# Patient Record
Sex: FEMALE | Race: WHITE | NOT HISPANIC OR LATINO | URBAN - METROPOLITAN AREA
[De-identification: names, ages, dates, MRNs, and addresses within clinical notes are randomized per-mention and may not be internally consistent; named-entity substitution may affect disease eponyms.]

---

## 2020-08-12 ENCOUNTER — WEB ENCOUNTER (OUTPATIENT)
Dept: URBAN - METROPOLITAN AREA CLINIC 2 | Facility: CLINIC | Age: 50
End: 2020-08-12

## 2020-09-14 ENCOUNTER — OFFICE VISIT (OUTPATIENT)
Dept: URBAN - METROPOLITAN AREA TELEHEALTH 2 | Facility: TELEHEALTH | Age: 50
End: 2020-09-14
Payer: COMMERCIAL

## 2020-09-14 DIAGNOSIS — K21.9 GERD: ICD-10-CM

## 2020-09-14 DIAGNOSIS — B96.81 HELICOBACTER PYLORI [H. PYLORI] AS THE CAUSE OF DISEASES CLASSIFIED ELSEWHERE: ICD-10-CM

## 2020-09-14 DIAGNOSIS — L29.0 ANAL ITCHING: ICD-10-CM

## 2020-09-14 DIAGNOSIS — R13.19 CERVICAL DYSPHAGIA: ICD-10-CM

## 2020-09-14 PROCEDURE — G9903 PT SCRN TBCO ID AS NON USER: HCPCS | Performed by: INTERNAL MEDICINE

## 2020-09-14 PROCEDURE — 99214 OFFICE O/P EST MOD 30 MIN: CPT | Performed by: INTERNAL MEDICINE

## 2020-09-14 PROCEDURE — G8422 PT INELIG BMI CALCULATION: HCPCS | Performed by: INTERNAL MEDICINE

## 2020-09-14 PROCEDURE — G8427 DOCREV CUR MEDS BY ELIG CLIN: HCPCS | Performed by: INTERNAL MEDICINE

## 2020-09-14 PROCEDURE — 1036F TOBACCO NON-USER: CPT | Performed by: INTERNAL MEDICINE

## 2020-09-14 RX ORDER — ALPHA-D-GALACTOSIDASE 400 UNIT
TWO TABLETS TABLET ORAL ONCE A DAY
Qty: 180 | OUTPATIENT
Start: 2020-09-14

## 2020-09-14 NOTE — HPI-OTHER HISTORIES
She reports on and off difficulty swallowing. We discussed restarting . She reports scot anal itching. We discussed lotrimin. We discused checking eradication of h pylori

## 2020-10-29 ENCOUNTER — WEB ENCOUNTER (OUTPATIENT)
Dept: URBAN - METROPOLITAN AREA CLINIC 21 | Facility: CLINIC | Age: 50
End: 2020-10-29

## 2020-12-14 ENCOUNTER — WEB ENCOUNTER (OUTPATIENT)
Dept: URBAN - METROPOLITAN AREA CLINIC 2 | Facility: CLINIC | Age: 50
End: 2020-12-14

## 2020-12-14 ENCOUNTER — OFFICE VISIT (OUTPATIENT)
Dept: URBAN - METROPOLITAN AREA CLINIC 2 | Facility: CLINIC | Age: 50
End: 2020-12-14
Payer: COMMERCIAL

## 2020-12-14 DIAGNOSIS — K29.70 GASTRITIS, UNSPECIFIED, WITHOUT BLEEDING: ICD-10-CM

## 2020-12-14 DIAGNOSIS — D50.9 IRON DEFICIENCY ANEMIA, UNSPECIFIED IRON DEFICIENCY ANEMIA TYPE: ICD-10-CM

## 2020-12-14 DIAGNOSIS — B96.81 HELICOBACTER PYLORI [H. PYLORI] AS THE CAUSE OF DISEASES CLASSIFIED ELSEWHERE: ICD-10-CM

## 2020-12-14 PROBLEM — 708164002: Status: ACTIVE | Noted: 2020-12-14

## 2020-12-14 PROCEDURE — G9903 PT SCRN TBCO ID AS NON USER: HCPCS | Performed by: NURSE PRACTITIONER

## 2020-12-14 PROCEDURE — 99214 OFFICE O/P EST MOD 30 MIN: CPT | Performed by: NURSE PRACTITIONER

## 2020-12-14 PROCEDURE — G8427 DOCREV CUR MEDS BY ELIG CLIN: HCPCS | Performed by: NURSE PRACTITIONER

## 2020-12-14 PROCEDURE — G8420 CALC BMI NORM PARAMETERS: HCPCS | Performed by: NURSE PRACTITIONER

## 2020-12-14 RX ORDER — PANTOPRAZOLE SODIUM 40 MG/1
1 TABLET TABLET, DELAYED RELEASE ORAL ONCE A DAY
Status: ACTIVE | COMMUNITY

## 2020-12-14 RX ORDER — ALPHA-D-GALACTOSIDASE 400 UNIT
TWO TABLETS TABLET ORAL ONCE A DAY
Qty: 180 | Status: ACTIVE | COMMUNITY
Start: 2020-09-14

## 2020-12-14 NOTE — HPI-TODAY'S VISIT:
50 yr old pt seen initially after a referral from Dr. Vidal Dominguez for anemia. She had EGD 12/2019 notable for possible Charles's esophagus and h pylori gastritis. Repeat EGD 4/20 notable for esophagitis and  Hpylori gastritis. She was given abx. since then continues to "not feel well." She c/o throat tightness, shortness of breath and a bubble in her esophagus and stomach. .  She is taking PPI and tagamet. She is here today in follow up. of note pt had colonoscopy about 3yrs ago. She does have a lot of stress with home and work and wonders if this stress may play a role in her symptoms. She does have IBS and occasionally has loose stools. She is unsure if her recent labs still indicated that she was anemic.

## 2020-12-15 ENCOUNTER — OFFICE VISIT (OUTPATIENT)
Dept: URBAN - METROPOLITAN AREA CLINIC 2 | Facility: CLINIC | Age: 50
End: 2020-12-15

## 2020-12-31 LAB
BASO (ABSOLUTE): 0
BASOS: 1
EOS (ABSOLUTE): 0.2
EOS: 4
FERRITIN, SERUM: 18
HEMATOCRIT: 36.1
HEMATOLOGY COMMENTS:: (no result)
HEMOGLOBIN: 12
IMMATURE CELLS: (no result)
IMMATURE GRANS (ABS): 0
IMMATURE GRANULOCYTES: 0
IRON BIND.CAP.(TIBC): 422
IRON SATURATION: 9
IRON: 39
LYMPHS (ABSOLUTE): 1.7
LYMPHS: 30
MCH: 27.8
MCHC: 33.2
MCV: 84
MONOCYTES(ABSOLUTE): 0.5
MONOCYTES: 9
NEUTROPHILS (ABSOLUTE): 3.3
NEUTROPHILS: 56
NRBC: (no result)
PLATELETS: 254
RBC: 4.31
RDW: 14.5
UIBC: 383
VITAMIN B12: 314
WBC: 5.8

## 2021-02-01 ENCOUNTER — OFFICE VISIT (OUTPATIENT)
Dept: URBAN - METROPOLITAN AREA CLINIC 2 | Facility: CLINIC | Age: 51
End: 2021-02-01

## 2021-02-15 ENCOUNTER — OFFICE VISIT (OUTPATIENT)
Dept: URBAN - METROPOLITAN AREA TELEHEALTH 2 | Facility: TELEHEALTH | Age: 51
End: 2021-02-15
Payer: COMMERCIAL

## 2021-02-15 DIAGNOSIS — D50.8 ANEMIA, DUE TO INADEQUATE IRON INTAKE: ICD-10-CM

## 2021-02-15 DIAGNOSIS — R19.7 DIARRHEA, UNSPECIFIED TYPE: ICD-10-CM

## 2021-02-15 DIAGNOSIS — K21.00 GASTROESOPHAGEAL REFLUX DISEASE WITH ESOPHAGITIS WITHOUT HEMORRHAGE: ICD-10-CM

## 2021-02-15 DIAGNOSIS — D50.9 IRON DEFICIENCY ANEMIA, UNSPECIFIED IRON DEFICIENCY ANEMIA TYPE: ICD-10-CM

## 2021-02-15 PROCEDURE — 99443 PHONE E/M BY PHYS 21-30 MIN: CPT | Performed by: INTERNAL MEDICINE

## 2021-02-15 RX ORDER — PANTOPRAZOLE SODIUM 40 MG/1
1 TABLET TABLET, DELAYED RELEASE ORAL ONCE A DAY
Status: ACTIVE | COMMUNITY

## 2021-02-15 RX ORDER — ALPHA-D-GALACTOSIDASE 400 UNIT
TWO TABLETS TABLET ORAL ONCE A DAY
Qty: 180 TABLET | Refills: 1
Start: 2020-09-14

## 2021-02-15 RX ORDER — BUTALBITAL AND ACETAMINOPHEN 50; 325 MG/1; MG/1
1 TABLET AS NEEDED TABLET ORAL
Status: ACTIVE | COMMUNITY

## 2021-02-15 RX ORDER — PROPRANOLOL HYDROCHLORIDE 20 MG/1
1 TABLET TABLET ORAL TWICE A DAY
Status: ACTIVE | COMMUNITY

## 2021-02-15 RX ORDER — ALPHA-D-GALACTOSIDASE 400 UNIT
TWO TABLETS TABLET ORAL ONCE A DAY
Qty: 180 | Status: ACTIVE | COMMUNITY
Start: 2020-09-14

## 2021-02-15 RX ORDER — MONTELUKAST SODIUM 10 MG/1
1 TABLET TABLET ORAL ONCE A DAY
Status: ACTIVE | COMMUNITY

## 2021-02-15 NOTE — HPI-TODAY'S VISIT:
Very pleasant 50 yr old female seen via teleheath today in follow up for GERD and BRADY. At her last visit dominick completed abx for H pylori. We stopped ppi and tagamet for test of clearance which was negative. She did not resume pantoprazole and only takes tagamet as needed. heartburn has improved since she has stopped taking BC powder for headaches.  She also started singulair and butalbital for headaches. She has been on 2 rounds of antibiotics for recurrent sinus infections. Since then she is having diarrhea. She did start probiotics today and will let us know if diarrhea does not improve.

## 2021-04-26 ENCOUNTER — OFFICE VISIT (OUTPATIENT)
Dept: URBAN - METROPOLITAN AREA CLINIC 2 | Facility: CLINIC | Age: 51
End: 2021-04-26
Payer: COMMERCIAL

## 2021-04-26 DIAGNOSIS — D50.9 IRON DEFICIENCY ANEMIA, UNSPECIFIED IRON DEFICIENCY ANEMIA TYPE: ICD-10-CM

## 2021-04-26 DIAGNOSIS — K21.00 GASTROESOPHAGEAL REFLUX DISEASE WITH ESOPHAGITIS WITHOUT HEMORRHAGE: ICD-10-CM

## 2021-04-26 PROBLEM — 87522002: Status: ACTIVE | Noted: 2020-12-14

## 2021-04-26 PROCEDURE — 99214 OFFICE O/P EST MOD 30 MIN: CPT | Performed by: NURSE PRACTITIONER

## 2021-04-26 RX ORDER — MONTELUKAST SODIUM 10 MG/1
1 TABLET TABLET ORAL ONCE A DAY
Status: ACTIVE | COMMUNITY

## 2021-04-26 RX ORDER — PANTOPRAZOLE SODIUM 40 MG/1
1 TABLET TABLET, DELAYED RELEASE ORAL ONCE A DAY
Status: ACTIVE | COMMUNITY

## 2021-04-26 RX ORDER — ALPHA-D-GALACTOSIDASE 400 UNIT
TWO TABLETS TABLET ORAL ONCE A DAY
Qty: 180 TABLET | Refills: 1 | Status: ACTIVE | COMMUNITY
Start: 2020-09-14

## 2021-04-26 RX ORDER — CIMETIDINE 200 MG/1
1 TABLET AT BEDTIME TABLET, FILM COATED ORAL ONCE A DAY
Status: ACTIVE | COMMUNITY

## 2021-04-26 RX ORDER — PROPRANOLOL HYDROCHLORIDE 20 MG/1
1 TABLET TABLET ORAL TWICE A DAY
Status: DISCONTINUED | COMMUNITY

## 2021-04-26 RX ORDER — BUTALBITAL AND ACETAMINOPHEN 50; 325 MG/1; MG/1
1 TABLET AS NEEDED TABLET ORAL
Status: ACTIVE | COMMUNITY

## 2021-04-26 NOTE — HPI-TODAY'S VISIT:
Very pleasant 51 yr old female here in follow up for BRADY and GERD. At her last visit anemia had resolved. She stopped pantoprazole and was only taking tagamet. She eventually stopped that too. however, her headaches have not been well controlled so she has resumed BC powder. Shehas noted increased heartburn and GERD  symptoms. She also has noted some increased fatigue this week and felt cold. No overt GI bleeding. Shehas hx of Charles'sesoahgus but none on last EGD.

## 2021-04-27 LAB
BASO (ABSOLUTE): 0.1
BASOS: 1
EOS (ABSOLUTE): 0.1
EOS: 2
HEMATOCRIT: 41.3
HEMATOLOGY COMMENTS:: (no result)
HEMOGLOBIN: 13.9
IMMATURE CELLS: (no result)
IMMATURE GRANS (ABS): 0
IMMATURE GRANULOCYTES: 0
IRON BIND.CAP.(TIBC): 399
IRON SATURATION: 21
IRON: 82
LYMPHS (ABSOLUTE): 2.2
LYMPHS: 38
MCH: 28
MCHC: 33.7
MCV: 83
MONOCYTES(ABSOLUTE): 0.5
MONOCYTES: 8
NEUTROPHILS (ABSOLUTE): 2.9
NEUTROPHILS: 51
NRBC: (no result)
PLATELETS: 282
RBC: 4.96
RDW: 14.5
UIBC: 317
WBC: 5.8

## 2021-05-14 ENCOUNTER — OFFICE VISIT (OUTPATIENT)
Dept: URBAN - METROPOLITAN AREA SURGERY CENTER 19 | Facility: SURGERY CENTER | Age: 51
End: 2021-05-14
Payer: COMMERCIAL

## 2021-05-14 DIAGNOSIS — K22.70 BARRETT ESOPHAGUS: ICD-10-CM

## 2021-05-14 DIAGNOSIS — K29.30 CHRONIC SUPERFICIAL GASTRITIS: ICD-10-CM

## 2021-05-14 DIAGNOSIS — K29.80 DUODENITIS: ICD-10-CM

## 2021-05-14 PROCEDURE — 43239 EGD BIOPSY SINGLE/MULTIPLE: CPT | Performed by: INTERNAL MEDICINE

## 2021-05-14 PROCEDURE — G8907 PT DOC NO EVENTS ON DISCHARG: HCPCS | Performed by: INTERNAL MEDICINE

## 2021-05-14 RX ORDER — CIMETIDINE 200 MG/1
1 TABLET AT BEDTIME TABLET, FILM COATED ORAL ONCE A DAY
Status: ACTIVE | COMMUNITY

## 2021-05-14 RX ORDER — BUTALBITAL AND ACETAMINOPHEN 50; 325 MG/1; MG/1
1 TABLET AS NEEDED TABLET ORAL
Status: ACTIVE | COMMUNITY

## 2021-05-14 RX ORDER — MONTELUKAST SODIUM 10 MG/1
1 TABLET TABLET ORAL ONCE A DAY
Status: ACTIVE | COMMUNITY

## 2021-05-14 RX ORDER — ALPHA-D-GALACTOSIDASE 400 UNIT
TWO TABLETS TABLET ORAL ONCE A DAY
Qty: 180 TABLET | Refills: 1 | Status: ACTIVE | COMMUNITY
Start: 2020-09-14

## 2021-05-14 RX ORDER — PANTOPRAZOLE SODIUM 40 MG/1
1 TABLET TABLET, DELAYED RELEASE ORAL ONCE A DAY
Status: ACTIVE | COMMUNITY

## 2021-06-11 ENCOUNTER — TELEPHONE ENCOUNTER (OUTPATIENT)
Dept: URBAN - METROPOLITAN AREA CLINIC 6 | Facility: CLINIC | Age: 51
End: 2021-06-11

## 2021-06-15 ENCOUNTER — DASHBOARD ENCOUNTERS (OUTPATIENT)
Age: 51
End: 2021-06-15

## 2021-06-17 ENCOUNTER — OFFICE VISIT (OUTPATIENT)
Dept: URBAN - METROPOLITAN AREA CLINIC 2 | Facility: CLINIC | Age: 51
End: 2021-06-17
Payer: COMMERCIAL

## 2021-06-17 DIAGNOSIS — K21.00 GASTROESOPHAGEAL REFLUX DISEASE WITH ESOPHAGITIS WITHOUT HEMORRHAGE: ICD-10-CM

## 2021-06-17 DIAGNOSIS — K29.90 GASTRITIS AND DUODENITIS: ICD-10-CM

## 2021-06-17 PROBLEM — 196731005: Status: ACTIVE | Noted: 2021-06-17

## 2021-06-17 PROCEDURE — 99214 OFFICE O/P EST MOD 30 MIN: CPT | Performed by: INTERNAL MEDICINE

## 2021-06-17 RX ORDER — ALPHA-D-GALACTOSIDASE 400 UNIT
TWO TABLETS TABLET ORAL ONCE A DAY
Qty: 180 TABLET | Refills: 1 | Status: ACTIVE | COMMUNITY
Start: 2020-09-14

## 2021-06-17 RX ORDER — MONTELUKAST SODIUM 10 MG/1
1 TABLET TABLET ORAL ONCE A DAY
Status: ACTIVE | COMMUNITY

## 2021-06-17 RX ORDER — PANTOPRAZOLE SODIUM 40 MG/1
1 TABLET TABLET, DELAYED RELEASE ORAL ONCE A DAY
Status: ACTIVE | COMMUNITY

## 2021-06-17 RX ORDER — BUTALBITAL AND ACETAMINOPHEN 50; 325 MG/1; MG/1
1 TABLET AS NEEDED TABLET ORAL
Status: ACTIVE | COMMUNITY

## 2021-06-17 RX ORDER — CIMETIDINE 200 MG/1
1 TABLET AT BEDTIME TABLET, FILM COATED ORAL ONCE A DAY
Status: ACTIVE | COMMUNITY

## 2021-06-17 NOTE — HPI-TODAY'S VISIT:
Very pleasant 51-year-old female here today in follow-up after an EGD to evaluate abdominal pain and due to her history of Charles's esophagus.  EGD was notable for inflammation in the esophagus stomach and small bowel.  Biopsies were negative for Charles's or infection only typical for reflux changes.  She is taking Tagamet which does seem to help some but she continues to feel a thickness in her esophagus.  She takes BC powders for headaches daily.  Her primary care is working with her to get better control of her headaches.  She has taken FD Joseluis before but is unsure if that helped because she did not take it for very long.  And also is unsure if pantoprazole worked any better than the Tagamet.

## 2022-08-01 ENCOUNTER — TELEPHONE ENCOUNTER (OUTPATIENT)
Dept: URBAN - METROPOLITAN AREA CLINIC 23 | Facility: CLINIC | Age: 52
End: 2022-08-01